# Patient Record
Sex: FEMALE
[De-identification: names, ages, dates, MRNs, and addresses within clinical notes are randomized per-mention and may not be internally consistent; named-entity substitution may affect disease eponyms.]

---

## 2024-01-18 ENCOUNTER — APPOINTMENT (OUTPATIENT)
Dept: CARDIOLOGY | Facility: CLINIC | Age: 34
End: 2024-01-18
Payer: COMMERCIAL

## 2024-01-18 ENCOUNTER — NON-APPOINTMENT (OUTPATIENT)
Age: 34
End: 2024-01-18

## 2024-01-18 VITALS — SYSTOLIC BLOOD PRESSURE: 135 MMHG | DIASTOLIC BLOOD PRESSURE: 90 MMHG

## 2024-01-18 VITALS
DIASTOLIC BLOOD PRESSURE: 89 MMHG | WEIGHT: 240 LBS | OXYGEN SATURATION: 99 % | BODY MASS INDEX: 35.55 KG/M2 | HEART RATE: 77 BPM | SYSTOLIC BLOOD PRESSURE: 138 MMHG | HEIGHT: 69 IN

## 2024-01-18 DIAGNOSIS — Z78.9 OTHER SPECIFIED HEALTH STATUS: ICD-10-CM

## 2024-01-18 DIAGNOSIS — Z82.49 FAMILY HISTORY OF ISCHEMIC HEART DISEASE AND OTHER DISEASES OF THE CIRCULATORY SYSTEM: ICD-10-CM

## 2024-01-18 DIAGNOSIS — Z82.3 FAMILY HISTORY OF STROKE: ICD-10-CM

## 2024-01-18 PROBLEM — Z00.00 ENCOUNTER FOR PREVENTIVE HEALTH EXAMINATION: Status: ACTIVE | Noted: 2024-01-18

## 2024-01-18 PROCEDURE — 99205 OFFICE O/P NEW HI 60 MIN: CPT | Mod: 25

## 2024-01-18 PROCEDURE — 93000 ELECTROCARDIOGRAM COMPLETE: CPT

## 2024-01-18 RX ORDER — ENOXAPARIN SODIUM 100 MG/ML
60 INJECTION SUBCUTANEOUS
Refills: 0 | Status: ACTIVE | COMMUNITY

## 2024-01-18 NOTE — HISTORY OF PRESENT ILLNESS
[FreeTextEntry1] : Odessa presented to the office today for a cardiovascular evaluation.  She presents for the further evaluation of chest discomfort.  She is now 33 years old, without a prior history of hypertension.  She has been diagnosed with postpartum preeclampsia.  She does not have a history of diabetes or hypercholesterolemia.  She is not known to have any underlying structural heart disease.    At baseline, she has tried to stay physically active.  Prior to getting pregnant, she was physically active, and went to the gym frequently, with her .  With regular physical activities, she has always felt well, without reproducible chest discomfort or shortness of breath suggestive of angina.  She denies orthopnea, PND and lower extremity edema.  She denies dizziness and syncope.  She has been in her usual state of health generally speaking until recently. She is now 5 1/2 weeks postpartum, via .  After delivery, she was initially well, but developed significant hypertension, and came back to the hospital.  She was evaluated in the emergency room.  An echocardiogram performed at that time revealed a structurally normal heart.  She was treated with labetalol, 200 mg twice daily.  In the context of high blood pressure, she has developed a lot of other symptoms, including various sorts of chest discomfort, palpitations and a lot of anxiety.  She presents to the office today to discuss her symptoms in more detail.  She has been experiencing intermittent chest discomfort.  She describes that she may experience a chest heaviness, which began as soon as she left the hospital.  When she returned with her blood pressure issues, she was given magnesium, reports that her chest discomfort was a bit better when that was infusing.  The chest discomfort itself may last for "a while ", and is not clearly related to physical activity or acute emotional stress.  This may be associated with excess belching, and she believes that this might be related to some gastrointestinal process.  There is another fleeting chest discomfort which she might feel, which seems random.  After her  section, while she was admitted, she became aware that her heart rate was somewhat irregular.  She was being monitored at that time, and brought it to the attention of the medical staff.  She was told that this might be related to excess fluid.  She does report that while she does experience intermittent palpitations if she has caffeine, further described as a "flutter ", she has not had caffeine recently.  She further describes her symptoms from caffeine as a sense that her heart is racing, but also potentially somewhat irregular.  She has been increasingly aware of her heart rate, and in the last 24 hours when she wore her Apple Watch, she believes that her resting heart rate was somewhat more accelerated than in the past, into the 90s.  She has been checking her blood pressure frequently, and at times finds readings that are well-controlled, and at times finds readings that are high.  She has become increasingly anxious about taking her blood pressure, which she is fairly confident is contaminating at least some of the readings which she takes.

## 2024-01-18 NOTE — DISCUSSION/SUMMARY
[FreeTextEntry1] : I expect that Odessa has a structurally normal heart, and that her postpartum preeclampsia will resolve, and time.  Unfortunately her emotional state is now contaminating many of her blood pressure measurements, and is causing an undue amount of attention to various sensations in her body, most of which are likely of no clinical importance.  Her blood pressure is elevated today.  I am sure her blood pressure today is exaggerated by an anxiety issue.  I have asked that she not take her blood pressure for the next week.  After that, she will start to try checking her blood pressure, hopefully without undue anxiety.  She will then hold labetalol if her systolic blood pressure is under 110, but otherwise continue labetalol, and come to the office after about 2 weeks of that.  She will bring her blood pressure machine and all her readings.  Hopefully we will be able to start down titrating labetalol.  If it seems like she cannot reliably take her blood pressure without anxiety, we will need to consider a 24-hour blood pressure monitor.  She is amenable to that.  I do not think that other testing would be useful at this time. [EKG obtained to assist in diagnosis and management of assessed problem(s)] : EKG obtained to assist in diagnosis and management of assessed problem(s)

## 2024-02-08 ENCOUNTER — APPOINTMENT (OUTPATIENT)
Dept: CARDIOLOGY | Facility: CLINIC | Age: 34
End: 2024-02-08

## 2024-02-27 ENCOUNTER — APPOINTMENT (OUTPATIENT)
Dept: CARDIOLOGY | Facility: CLINIC | Age: 34
End: 2024-02-27
Payer: COMMERCIAL

## 2024-02-27 ENCOUNTER — NON-APPOINTMENT (OUTPATIENT)
Age: 34
End: 2024-02-27

## 2024-02-27 PROCEDURE — 99212 OFFICE O/P EST SF 10 MIN: CPT

## 2024-03-05 ENCOUNTER — APPOINTMENT (OUTPATIENT)
Dept: CARDIOLOGY | Facility: CLINIC | Age: 34
End: 2024-03-05
Payer: COMMERCIAL

## 2024-03-05 ENCOUNTER — NON-APPOINTMENT (OUTPATIENT)
Age: 34
End: 2024-03-05

## 2024-03-05 VITALS
BODY MASS INDEX: 35.25 KG/M2 | HEIGHT: 69 IN | SYSTOLIC BLOOD PRESSURE: 126 MMHG | HEART RATE: 76 BPM | WEIGHT: 238 LBS | OXYGEN SATURATION: 98 % | DIASTOLIC BLOOD PRESSURE: 86 MMHG

## 2024-03-05 DIAGNOSIS — I10 ESSENTIAL (PRIMARY) HYPERTENSION: ICD-10-CM

## 2024-03-05 PROCEDURE — 99214 OFFICE O/P EST MOD 30 MIN: CPT | Mod: 25

## 2024-03-05 PROCEDURE — 93000 ELECTROCARDIOGRAM COMPLETE: CPT

## 2024-03-05 RX ORDER — LABETALOL HYDROCHLORIDE 200 MG/1
200 TABLET, FILM COATED ORAL
Qty: 180 | Refills: 2 | Status: DISCONTINUED | COMMUNITY
End: 2024-02-27

## 2024-03-05 NOTE — DISCUSSION/SUMMARY
[EKG obtained to assist in diagnosis and management of assessed problem(s)] : EKG obtained to assist in diagnosis and management of assessed problem(s) [FreeTextEntry1] : For the time being, I think that we do not need to do any additional testing.  Her blood pressure at home has been well-controlled.  Although it is certainly possible that she might develop essential hypertension, I am hopeful that her blood pressure will not require additional intervention in the near future.  She will continue to check her blood pressure over the next couple of months, with decreasing frequency.  She can follow-up on an as-needed basis in the future.

## 2024-03-05 NOTE — HISTORY OF PRESENT ILLNESS
[FreeTextEntry1] : Odessa presented to the office today for a cardiovascular evaluation.  I saw her in the office in .  She is now 33 years old, without a prior history of hypertension.  She has been diagnosed with postpartum preeclampsia.  She does not have a history of diabetes or hypercholesterolemia.  She is not known to have any underlying structural heart disease.    At baseline, she has tried to stay physically active.  Prior to getting pregnant, she was physically active, and went to the gym frequently, with her .  With regular physical activities, she has always felt well, without reproducible chest discomfort or shortness of breath suggestive of angina.  She denies orthopnea, PND and lower extremity edema.  She denies dizziness and syncope.  She has been in her usual state of health generally speaking until recently.  I saw her in , at which time she was 5-1/2 weeks postpartum, via  section.   After delivery, she was initially well, but developed significant hypertension, and came back to the hospital.  She was evaluated in the emergency room.  An echocardiogram performed at that time revealed a structurally normal heart.  She was treated with labetalol, 200 mg twice daily.  In the context of high blood pressure, she has developed a lot of other symptoms, including various sorts of chest discomfort, palpitations and a lot of anxiety.  She presented to the office to discuss this.  Her blood pressure was quite elevated, but fairly clearly exaggerated by anxiety.  I asked that she not take her blood pressure for the next week, after which I hoped that she would be able to take her blood pressure without a lot of anxiety.  I asked her to return to the office along with her blood pressure machine and all her readings so that I could hopefully down titrate her labetalol.  Her blood pressure ultimately improved, and labetalol was discontinued last month.  She does not report chest discomfort with activity.  She continues to experience intermittent right clavicular discomfort if she takes a very deep breath.  She denies orthopnea, PND and edema.  She denies dizziness and syncope.    She no longer experiences palpitations, and wonders whether labetalol was making that happen.  Her blood pressure at home has been very well-controlled.  She has been off labetalol.

## 2024-03-05 NOTE — PHYSICAL EXAM
[Well Nourished] : well nourished [Well Developed] : well developed [Normal Conjunctiva] : normal conjunctiva [No Acute Distress] : no acute distress [Normal Venous Pressure] : normal venous pressure [No Carotid Bruit] : no carotid bruit [Normal S1, S2] : normal S1, S2 [No Murmur] : no murmur [No Rub] : no rub [No Gallop] : no gallop [Clear Lung Fields] : clear lung fields [Soft] : abdomen soft [No Respiratory Distress] : no respiratory distress  [Good Air Entry] : good air entry [Non Tender] : non-tender [No Masses/organomegaly] : no masses/organomegaly [Normal Bowel Sounds] : normal bowel sounds [No Edema] : no edema [Normal Gait] : normal gait [No Cyanosis] : no cyanosis [No Clubbing] : no clubbing [No Varicosities] : no varicosities [No Skin Lesions] : no skin lesions [Moves all extremities] : moves all extremities [No Rash] : no rash [No Focal Deficits] : no focal deficits [Normal Speech] : normal speech [Alert and Oriented] : alert and oriented [Normal memory] : normal memory

## 2024-08-09 ENCOUNTER — APPOINTMENT (OUTPATIENT)
Dept: ORTHOPEDIC SURGERY | Facility: CLINIC | Age: 34
End: 2024-08-09

## 2024-08-09 PROBLEM — Z78.9 NO PERTINENT PAST MEDICAL HISTORY: Status: RESOLVED | Noted: 2024-08-09 | Resolved: 2024-08-09

## 2024-08-09 PROCEDURE — 73562 X-RAY EXAM OF KNEE 3: CPT | Mod: RT

## 2024-08-09 PROCEDURE — 99203 OFFICE O/P NEW LOW 30 MIN: CPT

## 2024-08-09 PROCEDURE — 73030 X-RAY EXAM OF SHOULDER: CPT | Mod: RT

## 2024-08-09 NOTE — REASON FOR VISIT
[FreeTextEntry2] : Tight right knee pain and swelling past 2 weeks with pain active 3 and rest 0/ Sharp right shoulder pain past 6 months with pain level active 6 and rest 4

## 2024-08-09 NOTE — PHYSICAL EXAM
[Normal Mood and Affect] : normal mood and affect [Able to Communicate] : able to communicate [Well Developed] : well developed [Well Nourished] : well nourished [NL (0)] : extension 0 degrees [5___] : hamstring 5[unfilled]/5 [NL (0-180)] : full passive forward flexion 0-180 degrees [NL (0-90)] : full external rotation 0-90 degrees [Right] : right knee [AP] : anteroposterior [Lateral] : lateral [[place]] : skyline [There are no fractures, subluxations or dislocations. No significant abnormalities are seen] : There are no fractures, subluxations or dislocations. No significant abnormalities are seen [Type 2 acromion] : Type 2 acromion [FreeTextEntry9] : IR T6, bilateral. [de-identified] : Mild Hay [] : non-antalgic [TWNoteComboBox7] : flexion 135 degrees

## 2024-08-09 NOTE — HISTORY OF PRESENT ILLNESS
[Gradual] : gradual [Heat] : heat [Walking] : walking [Full time] : Work status: full time [de-identified] : 8/9/24  Initial visit for this 33 year old female complaining of spontaneous onset of rt knee pain and swelling x last 2 weeks duration after getting up from the couch. Constant and daily since then. No pain at rest.  Pain at times when walking and going up steps on the medial side. Feels pressure.  Tried Tylenol and icing.  Not taking NSAIDs. Not limping.   Also complaining of intermittent right shoulder pain x2 months.  Has a young child and is painful when holding them.  Able to lay on that side but is able to.  Pain comes and goes depending on the position.   PMH:  No prior right knee issues. No prior right shoulder issues.  [] : no [FreeTextEntry1] : right knee /right shoulder [FreeTextEntry9] : tylenol [de-identified] : shoulder- lifting [de-identified] : recreuter

## 2024-08-09 NOTE — PLAN
[TextEntry] : The patient was advised of the diagnosis. The natural history of the pathology was explained in full to the patient in layman's terms. All questions were answered. The risks and benefits of surgical and non-surgical treatment alternatives were explained in full to the patient.   Guilhermeene prescribed.   Apply ice to the areas.  Avoid kneeling and squatting.  Consider cortisone injection right shoulder and right knee if symptoms do not improve.

## 2024-09-05 ENCOUNTER — APPOINTMENT (OUTPATIENT)
Dept: ORTHOPEDIC SURGERY | Facility: CLINIC | Age: 34
End: 2024-09-05
Payer: COMMERCIAL

## 2024-09-05 VITALS — WEIGHT: 240 LBS | BODY MASS INDEX: 35.55 KG/M2 | HEIGHT: 69 IN

## 2024-09-05 DIAGNOSIS — M22.41 CHONDROMALACIA PATELLAE, RIGHT KNEE: ICD-10-CM

## 2024-09-05 DIAGNOSIS — M23.91 UNSPECIFIED INTERNAL DERANGEMENT OF RIGHT KNEE: ICD-10-CM

## 2024-09-05 DIAGNOSIS — M75.41 IMPINGEMENT SYNDROME OF RIGHT SHOULDER: ICD-10-CM

## 2024-09-05 PROCEDURE — 99213 OFFICE O/P EST LOW 20 MIN: CPT

## 2024-09-05 NOTE — HISTORY OF PRESENT ILLNESS
[3] : 3 [Sharp] : sharp [Intermittent] : intermittent [Full time] : Work status: full time [Gradual] : gradual [Heat] : heat [Walking] : walking [de-identified] : 9/5/24  Returns today still c/o rt anterior knee pain. Taking Feldene daily with some relief.  8/9/24  Initial visit for this 33 year old female complaining of spontaneous onset of rt knee pain and swelling x last 2 weeks duration after getting up from the couch. Constant and daily since then. No pain at rest.  Pain at times when walking and going up steps on the medial side. Feels pressure.  Tried Tylenol and icing.  Not taking NSAIDs. Not limping.   Also complaining of intermittent right shoulder pain x2 months.  Has a young child and is painful when holding them.  Able to lay on that side but is able to.  Pain comes and goes depending on the position.   PMH:  No prior right knee issues. No prior right shoulder issues.  [] : no [FreeTextEntry1] : right knee /right shoulder [FreeTextEntry9] : tylenol [de-identified] : shoulder- lifting [de-identified] : none [de-identified] : recreuter

## 2024-09-05 NOTE — PHYSICAL EXAM
[Normal Mood and Affect] : normal mood and affect [Able to Communicate] : able to communicate [Well Developed] : well developed [Well Nourished] : well nourished [NL (0-180)] : full passive forward flexion 0-180 degrees [NL (0-90)] : full external rotation 0-90 degrees [Type 2 acromion] : Type 2 acromion [NL (0)] : extension 0 degrees [5___] : hamstring 5[unfilled]/5 [Right] : right knee [AP] : anteroposterior [Lateral] : lateral [[place]] : skyline [There are no fractures, subluxations or dislocations. No significant abnormalities are seen] : There are no fractures, subluxations or dislocations. No significant abnormalities are seen [FreeTextEntry9] : IR T6, bilateral. [de-identified] : Mild Hay [] : non-antalgic [TWNoteComboBox7] : flexion 135 degrees

## 2024-09-05 NOTE — PLAN
[TextEntry] : The patient was advised of the diagnosis. The natural history of the pathology was explained in full to the patient in layman's terms. All questions were answered. The risks and benefits of surgical and non-surgical treatment alternatives were explained in full to the patient.  Continue with Guilhermeene  Will obtain an MRI rt knee.  Patient chose to defer PT for now. Will opt for HEP.  If no improvement consider cortisone injection

## 2024-09-07 ENCOUNTER — APPOINTMENT (OUTPATIENT)
Dept: MRI IMAGING | Facility: CLINIC | Age: 34
End: 2024-09-07
Payer: COMMERCIAL

## 2024-09-07 PROCEDURE — 73721 MRI JNT OF LWR EXTRE W/O DYE: CPT | Mod: RT

## 2024-09-19 ENCOUNTER — APPOINTMENT (OUTPATIENT)
Dept: ORTHOPEDIC SURGERY | Facility: CLINIC | Age: 34
End: 2024-09-19
Payer: COMMERCIAL

## 2024-09-19 VITALS — WEIGHT: 230 LBS | HEIGHT: 69 IN | BODY MASS INDEX: 34.07 KG/M2

## 2024-09-19 DIAGNOSIS — M23.91 UNSPECIFIED INTERNAL DERANGEMENT OF RIGHT KNEE: ICD-10-CM

## 2024-09-19 DIAGNOSIS — M22.41 CHONDROMALACIA PATELLAE, RIGHT KNEE: ICD-10-CM

## 2024-09-19 PROCEDURE — 99213 OFFICE O/P EST LOW 20 MIN: CPT

## 2024-09-19 RX ORDER — CELECOXIB 200 MG/1
200 CAPSULE ORAL DAILY
Qty: 30 | Refills: 5 | Status: ACTIVE | COMMUNITY
Start: 2024-09-19 | End: 1900-01-01

## 2024-09-19 NOTE — PLAN
[TextEntry] : The patient was advised of the diagnosis. The natural history of the pathology was explained in full to the patient in layman's terms. All questions were answered. The risks and benefits of surgical and non-surgical treatment alternatives were explained in full to the patient.  Will switch from Feldene to Celebrex  Patient chose to defer PT for now. Will opt for HEP.  Pt chose to defer an injection. Will start with more conservative measures for now.

## 2024-09-19 NOTE — PHYSICAL EXAM
[Normal Mood and Affect] : normal mood and affect [Able to Communicate] : able to communicate [Well Developed] : well developed [Well Nourished] : well nourished [NL (0-180)] : full passive forward flexion 0-180 degrees [NL (0-90)] : full external rotation 0-90 degrees [Type 2 acromion] : Type 2 acromion [NL (0)] : extension 0 degrees [5___] : hamstring 5[unfilled]/5 [Right] : right knee [AP] : anteroposterior [Lateral] : lateral [[place]] : skyline [There are no fractures, subluxations or dislocations. No significant abnormalities are seen] : There are no fractures, subluxations or dislocations. No significant abnormalities are seen [FreeTextEntry9] : IR T6, bilateral. [de-identified] : Mild Hay [] : non-antalgic [TWNoteComboBox7] : flexion 135 degrees

## 2024-09-19 NOTE — PHYSICAL EXAM
[Normal Mood and Affect] : normal mood and affect [Able to Communicate] : able to communicate [Well Developed] : well developed [Well Nourished] : well nourished [NL (0-180)] : full passive forward flexion 0-180 degrees [NL (0-90)] : full external rotation 0-90 degrees [Type 2 acromion] : Type 2 acromion [NL (0)] : extension 0 degrees [5___] : hamstring 5[unfilled]/5 [Right] : right knee [AP] : anteroposterior [Lateral] : lateral [[place]] : skyline [There are no fractures, subluxations or dislocations. No significant abnormalities are seen] : There are no fractures, subluxations or dislocations. No significant abnormalities are seen [FreeTextEntry9] : IR T6, bilateral. [de-identified] : Mild Hay [] : non-antalgic [TWNoteComboBox7] : flexion 135 degrees

## 2024-09-19 NOTE — HISTORY OF PRESENT ILLNESS
[Gradual] : gradual [3] : 3 [Sharp] : sharp [Intermittent] : intermittent [Heat] : heat [Walking] : walking [Full time] : Work status: full time [5] : 5 [de-identified] : 09/19/24:  Returns today for right knee MRI results. tried feldene daily w/ only partial relief.  IMPRESSION:  RIGHT 1.  Unremarkable study. 2.  No obvious fraying/defect in the articular cartilage of the patella to suggest chondromalacia.  9/5/24  Returns today still c/o rt anterior knee pain. Taking Feldene daily with some relief.  8/9/24  Initial visit for this 33 year old female complaining of spontaneous onset of rt knee pain and swelling x last 2 weeks duration after getting up from the couch. Constant and daily since then. No pain at rest.  Pain at times when walking and going up steps on the medial side. Feels pressure.  Tried Tylenol and icing.  Not taking NSAIDs. Not limping.   Also complaining of intermittent right shoulder pain x2 months.  Has a young child and is painful when holding them.  Able to lay on that side but is able to.  Pain comes and goes depending on the position.   PMH:  No prior right knee issues. No prior right shoulder issues.  [] : no [FreeTextEntry1] : right knee /right shoulder [FreeTextEntry9] : tylenol [de-identified] : shoulder- lifting [de-identified] : none [de-identified] : recreuter

## 2024-09-19 NOTE — HISTORY OF PRESENT ILLNESS
[Gradual] : gradual [3] : 3 [Sharp] : sharp [Intermittent] : intermittent [Heat] : heat [Walking] : walking [Full time] : Work status: full time [5] : 5 [de-identified] : 09/19/24:  Returns today for right knee MRI results. tried feldene daily w/ only partial relief.  IMPRESSION:  RIGHT 1.  Unremarkable study. 2.  No obvious fraying/defect in the articular cartilage of the patella to suggest chondromalacia.  9/5/24  Returns today still c/o rt anterior knee pain. Taking Feldene daily with some relief.  8/9/24  Initial visit for this 33 year old female complaining of spontaneous onset of rt knee pain and swelling x last 2 weeks duration after getting up from the couch. Constant and daily since then. No pain at rest.  Pain at times when walking and going up steps on the medial side. Feels pressure.  Tried Tylenol and icing.  Not taking NSAIDs. Not limping.   Also complaining of intermittent right shoulder pain x2 months.  Has a young child and is painful when holding them.  Able to lay on that side but is able to.  Pain comes and goes depending on the position.   PMH:  No prior right knee issues. No prior right shoulder issues.  [] : no [FreeTextEntry1] : right knee /right shoulder [FreeTextEntry9] : tylenol [de-identified] : shoulder- lifting [de-identified] : none [de-identified] : recreuter